# Patient Record
Sex: FEMALE | Race: WHITE | NOT HISPANIC OR LATINO | URBAN - NONMETROPOLITAN AREA
[De-identification: names, ages, dates, MRNs, and addresses within clinical notes are randomized per-mention and may not be internally consistent; named-entity substitution may affect disease eponyms.]

---

## 2018-12-12 PROBLEM — H26.493: Noted: 2018-12-12

## 2018-12-12 PROBLEM — Z96.1: Noted: 2018-12-12

## 2018-12-12 PROBLEM — H52.4: Noted: 2018-12-12

## 2018-12-12 PROBLEM — H52.223: Noted: 2018-12-12

## 2018-12-12 PROBLEM — H43.813: Noted: 2018-12-12

## 2018-12-12 PROBLEM — H52.03: Noted: 2018-12-12

## 2018-12-12 PROBLEM — H04.123: Noted: 2018-12-12

## 2019-03-08 ENCOUNTER — IMPORTED ENCOUNTER (OUTPATIENT)
Dept: URBAN - NONMETROPOLITAN AREA CLINIC 1 | Facility: CLINIC | Age: 79
End: 2019-03-08

## 2019-03-08 PROCEDURE — 92012 INTRM OPH EXAM EST PATIENT: CPT

## 2019-03-08 NOTE — PATIENT DISCUSSION
Internal Hordeolum RUL-  discussed findings w/patient-  start warm compresses w/ocular massage-  start Maxitrol QID OD -  start Triamcinolone Cream 0.1% upper lids TID x 3-4 days QHS x3-4 days -  monitor 1 weekP/C IOL OU w/ PCO -  discussed findings w/patient-  PCO noted OU not yet surgical-  no treatment indicated at this time-  continue to monitor yearly or prn Dry Eye Syndrome-  discussed refractive status w/patient-  continue At's prn Refresh or Systane recommended-  continue to monitor yearly or prn; 's Notes: MR 12/12/18DFE 12/12/18

## 2020-07-10 ENCOUNTER — IMPORTED ENCOUNTER (OUTPATIENT)
Dept: URBAN - NONMETROPOLITAN AREA CLINIC 1 | Facility: CLINIC | Age: 80
End: 2020-07-10

## 2020-07-10 PROBLEM — H52.4: Noted: 2018-12-12

## 2020-07-10 PROBLEM — H16.223: Noted: 2020-07-10

## 2020-07-10 PROBLEM — H52.223: Noted: 2018-12-12

## 2020-07-10 PROBLEM — H52.03: Noted: 2018-12-12

## 2020-07-10 PROBLEM — H26.493: Noted: 2020-07-10

## 2020-07-10 PROBLEM — H43.813: Noted: 2018-12-12

## 2020-07-10 PROBLEM — Z96.1: Noted: 2020-07-10

## 2020-07-10 PROCEDURE — 92012 INTRM OPH EXAM EST PATIENT: CPT

## 2020-07-10 NOTE — PATIENT DISCUSSION
MAINE OU-  discussed findings w/patient-  symptoms/signs are worsening-  patient has not been using OTC drops bc she can not afford them-  start PF 1% TID OU gave Good Rx coupon-  RTC 3 weeks f/u or prnP/C IOL OU w/ PCO -  discussed findings w/patient-  PCO noted OU not yet surgical-  no treatment indicated at this time-  continue to monitor yearly or prn; 's Notes: MR 12/12/18DFE 12/12/18

## 2020-08-03 ENCOUNTER — IMPORTED ENCOUNTER (OUTPATIENT)
Dept: URBAN - NONMETROPOLITAN AREA CLINIC 1 | Facility: CLINIC | Age: 80
End: 2020-08-03

## 2020-08-03 PROCEDURE — 99213 OFFICE O/P EST LOW 20 MIN: CPT

## 2020-08-03 NOTE — PATIENT DISCUSSION
MAINE OU-  discussed findings w/patient-  SPK today has improved from last visit-  patient has been using Pred Forte BID-TID occasionally just QD-  taper Pred Forte 1% OD BID x 1 week then QD x 1 week then d/c -  RTC as scheduled or prn P/C IOL OU w/ PCO -  discussed findings w/patient-  PCO noted OU not yet surgical-  no treatment indicated at this time-  continue to monitor yearly or prn; 's Notes: MR 12/12/18DFE 12/12/18

## 2020-08-04 PROBLEM — Z96.1: Noted: 2020-07-10

## 2020-08-04 PROBLEM — H16.223: Noted: 2020-07-10

## 2020-08-04 PROBLEM — H26.493: Noted: 2020-07-10

## 2020-08-04 PROBLEM — H52.4: Noted: 2018-12-12

## 2020-08-04 PROBLEM — H52.223: Noted: 2018-12-12

## 2020-08-04 PROBLEM — H52.03: Noted: 2018-12-12

## 2020-08-04 PROBLEM — H43.813: Noted: 2020-08-04

## 2021-05-17 ENCOUNTER — IMPORTED ENCOUNTER (OUTPATIENT)
Dept: URBAN - NONMETROPOLITAN AREA CLINIC 1 | Facility: CLINIC | Age: 81
End: 2021-05-17

## 2021-05-17 PROCEDURE — 92015 DETERMINE REFRACTIVE STATE: CPT

## 2021-05-17 PROCEDURE — 92014 COMPRE OPH EXAM EST PT 1/>: CPT

## 2021-05-17 NOTE — PATIENT DISCUSSION
MAINE OU-  discussed findings w/patient-  mild SPK noted OU-  patient is very symptomatic at this time-  discussed plugs w/patient she would like to try them at this time-  Inserted E2 E4 today0.6mm Superflex Plug REF [id number] LOT [id number]-  RTC 2 week f/u or prnP/C IOL OU w/ PCO -  discussed findings w/patient-  PCO noted OU not yet surgical-  no treatment indicated at this time-  continue to monitor yearly or prn PVD OU-  discussed findings w/patient-  Retina flat 360 with no breaks tears or heme. -  S&S of RD/RT reviewed with pt. -  Stressed that pt should contact our office right away with any changes or increase in symptoms.-  RTC 1 year or prnMixed Astigmatism OU w/Presbyopia-  discussed findings w/patient-  patient defers new Rx at this time-  continue to monitor yearly or prn; 's Notes:  5/17/2021D 5/17/2021

## 2021-06-01 ENCOUNTER — IMPORTED ENCOUNTER (OUTPATIENT)
Dept: URBAN - NONMETROPOLITAN AREA CLINIC 1 | Facility: CLINIC | Age: 81
End: 2021-06-01

## 2021-06-01 NOTE — PATIENT DISCUSSION
MAINE ROSAS-  discussed findings w/patient-  no SPK noted at this time-  patient doing much better at this time-  0.6mm Superflex Plugs in place E2/E4-  RTC 3 mo f/u or prn; 's Notes: MR 5/17/2021DFE 5/17/2021

## 2022-04-09 ASSESSMENT — VISUAL ACUITY
OD_CC: 20/40
OD_CC: 20/25
OU_CC: 20/20
OU_CC: 20/30+2
OU_CC: 20/25
OS_CC: 20/25
OD_SC: 20/25
OD_CC: 20/30
OS_CC: 20/25+
OS_CC: 20/25
OS_CC: 20/30-1
OS_SC: 20/20
OD_CC: 20/25-

## 2022-04-09 ASSESSMENT — TONOMETRY
OD_IOP_MMHG: 18
OS_IOP_MMHG: 21
OD_IOP_MMHG: 19
OD_IOP_MMHG: 19
OS_IOP_MMHG: 19
OS_IOP_MMHG: 19
OS_IOP_MMHG: 18
OD_IOP_MMHG: 19
OD_IOP_MMHG: 20
OS_IOP_MMHG: 20